# Patient Record
Sex: FEMALE | Race: BLACK OR AFRICAN AMERICAN | Employment: FULL TIME | URBAN - METROPOLITAN AREA
[De-identification: names, ages, dates, MRNs, and addresses within clinical notes are randomized per-mention and may not be internally consistent; named-entity substitution may affect disease eponyms.]

---

## 2024-08-31 ENCOUNTER — APPOINTMENT (OUTPATIENT)
Dept: GENERAL RADIOLOGY | Age: 44
End: 2024-08-31
Payer: COMMERCIAL

## 2024-08-31 ENCOUNTER — HOSPITAL ENCOUNTER (EMERGENCY)
Age: 44
Discharge: HOME | End: 2024-08-31
Attending: STUDENT IN AN ORGANIZED HEALTH CARE EDUCATION/TRAINING PROGRAM
Payer: COMMERCIAL

## 2024-08-31 VITALS
HEIGHT: 65 IN | DIASTOLIC BLOOD PRESSURE: 108 MMHG | SYSTOLIC BLOOD PRESSURE: 160 MMHG | HEART RATE: 65 BPM | OXYGEN SATURATION: 100 % | RESPIRATION RATE: 14 BRPM | BODY MASS INDEX: 26.63 KG/M2 | WEIGHT: 159.83 LBS | TEMPERATURE: 98.1 F

## 2024-08-31 DIAGNOSIS — S92.502A CLOSED FRACTURE OF PHALANX OF LEFT FOURTH TOE, INITIAL ENCOUNTER: Primary | ICD-10-CM

## 2024-08-31 PROCEDURE — 99283 EMERGENCY DEPT VISIT LOW MDM: CPT

## 2024-08-31 PROCEDURE — 73660 X-RAY EXAM OF TOE(S): CPT

## 2024-08-31 ASSESSMENT — PAIN DESCRIPTION - FREQUENCY: FREQUENCY: CONTINUOUS

## 2024-08-31 ASSESSMENT — PAIN SCALES - GENERAL: PAINLEVEL_OUTOF10: 2

## 2024-08-31 ASSESSMENT — PAIN DESCRIPTION - PAIN TYPE: TYPE: ACUTE PAIN

## 2024-08-31 ASSESSMENT — PAIN - FUNCTIONAL ASSESSMENT
PAIN_FUNCTIONAL_ASSESSMENT: PREVENTS OR INTERFERES SOME ACTIVE ACTIVITIES AND ADLS
PAIN_FUNCTIONAL_ASSESSMENT: 0-10

## 2024-08-31 ASSESSMENT — PAIN DESCRIPTION - LOCATION: LOCATION: TOE (COMMENT WHICH ONE)

## 2024-08-31 ASSESSMENT — PAIN DESCRIPTION - ONSET: ONSET: ON-GOING

## 2024-08-31 ASSESSMENT — PAIN DESCRIPTION - DESCRIPTORS: DESCRIPTORS: DISCOMFORT

## 2024-08-31 ASSESSMENT — PAIN DESCRIPTION - ORIENTATION: ORIENTATION: LEFT

## 2024-08-31 NOTE — ED PROVIDER NOTES
[facility] EMERGENCY DEPARTMENT     EMERGENCY DEPARTMENT ENCOUNTER            Pt Name: Karrie Brewer   MRN: [id number]   Birthdate 1980   Date of evaluation: 8/31/2024   Provider: Luisa Briscoe MD   PCP: No primary care provider on file.   Note Started: 3:34 PM EDT 8/31/24          CHIEF COMPLAINT     Chief Complaint   Patient presents with    Toe Injury     Left 4th       HISTORY OF PRESENT ILLNESS:   History from : Patient   Limitations to history : None     Karrie Brewer is a 44 y.o. female who presents complaining of left fourth toe pain.  Patient states she was cleaning her kitchen earlier today and she stubbed her toe.  Has been having pain in her toes since then.  Took some ibuprofen shortly before arrival.  Has been able to ambulate.  States that pain is worse when her toes moved side-to-side, she was attempting to have her daughter tape it for her and was unable to do so secondary to the pain.  She denies any other complaints or concerns.    Nursing Notes were all reviewed and agreed with, or any disagreements were addressed in the HPI.     REVIEW OF SYSTEMS :    Positives and Pertinent negatives as per HPI.      MEDICAL HISTORY   has no past medical history on file.    History reviewed. No pertinent surgical history.   CURRENTMEDICATIONS       Previous Medications    No medications on file      SCREENINGS          [place] Coma Scale  Eye Opening: Spontaneous  Best Verbal Response: Oriented  Best Motor Response: Obeys commands  Geovanny Coma Scale Score: 15                CIWA Assessment  BP: (!) 160/108  Pulse: 65                  PHYSICAL EXAM :  ED Triage Vitals   BP Systolic BP Percentile Diastolic BP Percentile Temp Temp src Pulse Resp SpO2   -- -- -- -- -- -- -- --      Height Weight         -- --            GENERAL APPEARANCE: Awake and alert. Cooperative. No acute distress.  HEAD: Normocephalic. Atraumatic.  EXTREMITIES: No peripheral edema. No acute deformities.  Mild tenderness  to palpation over proximal phalanx of left fourth toe.  SKIN: Warm and dry. No acute rashes.   NEUROLOGICAL: Alert and oriented    DIAGNOSTIC RESULTS     LABS:   Labs Reviewed - No data to display   When ordered only abnormal lab results are displayed. All other labs were within normal range or not returned as of this dictation.     RADIOLOGY:      Non-plain film images such as CT, Ultrasound and MRI are read by the radiologist. Plain radiographic images are visualized and preliminarily interpreted by the ED Provider with the below findings:   Interpretation per the Radiologist below, if available at the time of this note:  XR TOE LEFT (MIN 2 VIEWS)   Final Result   Nondisplaced fourth proximal phalanx fracture.         Electronically signed by Easton Simon         EKG: NA     PROCEDURES   Unless otherwise noted below, none     CRITICAL CARE TIME   I personally saw the patient and independently provided 0 minutes of non-concurrent critical care out of the total shared critical care time excluding separately billable procedures.        Vitals:    Vitals:    08/31/24 1536   BP: (!) 160/108   Pulse: 65   Resp: 14   Temp: 98.1 °F (36.7 °C)   TempSrc: Oral   SpO2: 100%   Weight: 72.5 kg (159 lb 13.3 oz)   Height: 1.651 m (5' 5\")             Is this patient to be included in the SEP-1 Core Measure due to severe sepsis or septic shock?   No   Exclusion criteria - the patient is NOT to be included for SEP-1 Core Measure due to:  Infection is not suspected       CC/HPI Summary, DDx, ED Course, and Reassessment: Patient is a 44-year-old female, presenting with concerns for left fourth toe injury after she stepped at cleaning her kitchen earlier today.  Upon arrival in the ED, vitals remarkable for hypertension.  Otherwise reassuring.  Patient is resting comfortably and is in no acute distress.  Left foot is neurovascularly intact.  She does have tenderness to palpation over the proximal phalanx of the left fourth toe.  No  obvious deformities.  X-ray was performed which showed a nondisplaced fracture of the proximal phalanx of the toe.  Patient was advised to continue over-the-counter medications, counseled on using ice to help with swelling and to keep it elevated.  Toe will be kay taped prior to discharge.  She is advised to follow-up with her primary care provider within 1 to 2 weeks for reevaluation to ensure that her symptoms are improving.  She is given return precautions for the ED for any new or worsening symptoms.  She is comfortable in agreement with plan of care was discharged.       Patient was given the following medications:   Medications - No data to display     CONSULTS:   None   Discussion with Other Professionals: None   Social Determinants: None   Chronic Conditions:  None    Records Reviewed: NA      Disposition Considerations: Appropriate for outpatient management  I am the Primary Clinician of Record.        FINAL IMPRESSION    1. Closed fracture of phalanx of left fourth toe, initial encounter           DISPOSITION/PLAN:     Pt discharged home in stable condition.     PATIENT REFERRED TO:   Your PCP    Schedule an appointment as soon as possible for a visit in 1 week      [facility] Emergency Department  [address] [place], [place] [id number] [phone]  Go to   If symptoms worsen       DISCHARGE MEDICATIONS:   New Prescriptions    No medications on file        DISCONTINUED MEDICATIONS:   Discontinued Medications    No medications on file              (Please note that portions of this note were completed with a voice recognition program.  Efforts were made to edit the dictations but occasionally words are mis-transcribed.)       Luisa Briscoe MD (electronically signed)             Luisa Briscoe MD  08/31/24 3297